# Patient Record
Sex: FEMALE | Race: ASIAN | Employment: OTHER | URBAN - METROPOLITAN AREA
[De-identification: names, ages, dates, MRNs, and addresses within clinical notes are randomized per-mention and may not be internally consistent; named-entity substitution may affect disease eponyms.]

---

## 2023-03-22 ENCOUNTER — HOSPITAL ENCOUNTER (OUTPATIENT)
Age: 78
Discharge: HOME | End: 2023-03-22
Payer: MEDICARE

## 2023-03-22 LAB
ALBUMIN SERPL-MCNC: 4.6 GM/DL (ref 3.4–5)
ALP BLD-CCNC: 53 IU/L (ref 40–128)
ALT SERPL-CCNC: 16 U/L (ref 10–40)
ANION GAP SERPL CALCULATED.3IONS-SCNC: 10 MMOL/L (ref 4–16)
AST SERPL-CCNC: 27 IU/L (ref 15–37)
BASOPHILS ABSOLUTE: 0 K/CU MM
BASOPHILS RELATIVE PERCENT: 0.4 % (ref 0–1)
BILIRUB SERPL-MCNC: 0.5 MG/DL (ref 0–1)
BUN SERPL-MCNC: 18 MG/DL (ref 6–23)
CALCIUM SERPL-MCNC: 9.7 MG/DL (ref 8.3–10.6)
CHLORIDE BLD-SCNC: 101 MMOL/L (ref 99–110)
CHOLEST SERPL-MCNC: 188 MG/DL
CO2: 30 MMOL/L (ref 21–32)
CREAT SERPL-MCNC: 0.8 MG/DL (ref 0.6–1.1)
CRP SERPL HS-MCNC: 3 MG/L
DIFFERENTIAL TYPE: ABNORMAL
EOSINOPHILS ABSOLUTE: 0.2 K/CU MM
EOSINOPHILS RELATIVE PERCENT: 4.6 % (ref 0–3)
GFR SERPL CREATININE-BSD FRML MDRD: >60 ML/MIN/1.73M2
GLUCOSE SERPL-MCNC: 100 MG/DL (ref 70–99)
HCT VFR BLD CALC: 43.1 % (ref 37–47)
HDLC SERPL-MCNC: 61 MG/DL
HEMOGLOBIN: 13.9 GM/DL (ref 12.5–16)
IMMATURE NEUTROPHIL %: 0.2 % (ref 0–0.43)
LDLC SERPL CALC-MCNC: 80 MG/DL
LYMPHOCYTES ABSOLUTE: 1.5 K/CU MM
LYMPHOCYTES RELATIVE PERCENT: 32.1 % (ref 24–44)
MCH RBC QN AUTO: 30.9 PG (ref 27–31)
MCHC RBC AUTO-ENTMCNC: 32.3 % (ref 32–36)
MCV RBC AUTO: 95.8 FL (ref 78–100)
MONOCYTES ABSOLUTE: 0.5 K/CU MM
MONOCYTES RELATIVE PERCENT: 10.5 % (ref 0–4)
NUCLEATED RBC %: 0 %
PDW BLD-RTO: 12.8 % (ref 11.7–14.9)
PLATELET # BLD: 224 K/CU MM (ref 140–440)
PMV BLD AUTO: 9.9 FL (ref 7.5–11.1)
POTASSIUM SERPL-SCNC: 4.8 MMOL/L (ref 3.5–5.1)
RBC # BLD: 4.5 M/CU MM (ref 4.2–5.4)
SEGMENTED NEUTROPHILS ABSOLUTE COUNT: 2.4 K/CU MM
SEGMENTED NEUTROPHILS RELATIVE PERCENT: 52.2 % (ref 36–66)
SODIUM BLD-SCNC: 141 MMOL/L (ref 135–145)
TOTAL IMMATURE NEUTOROPHIL: 0.01 K/CU MM
TOTAL NUCLEATED RBC: 0 K/CU MM
TOTAL PROTEIN: 6.9 GM/DL (ref 6.4–8.2)
TRIGL SERPL-MCNC: 237 MG/DL
TSH SERPL DL<=0.005 MIU/L-ACNC: 2.76 UIU/ML (ref 0.27–4.2)
WBC # BLD: 4.6 K/CU MM (ref 4–10.5)

## 2023-03-22 PROCEDURE — 85025 COMPLETE CBC W/AUTO DIFF WBC: CPT

## 2023-03-22 PROCEDURE — 80053 COMPREHEN METABOLIC PANEL: CPT

## 2023-03-22 PROCEDURE — 80061 LIPID PANEL: CPT

## 2023-03-22 PROCEDURE — 86140 C-REACTIVE PROTEIN: CPT

## 2023-03-22 PROCEDURE — 36415 COLL VENOUS BLD VENIPUNCTURE: CPT

## 2023-03-22 PROCEDURE — 84443 ASSAY THYROID STIM HORMONE: CPT

## 2024-04-03 ENCOUNTER — OFFICE VISIT (OUTPATIENT)
Dept: FAMILY MEDICINE CLINIC | Age: 79
End: 2024-04-03
Payer: MEDICARE

## 2024-04-03 VITALS
HEART RATE: 64 BPM | DIASTOLIC BLOOD PRESSURE: 84 MMHG | SYSTOLIC BLOOD PRESSURE: 122 MMHG | WEIGHT: 112 LBS | OXYGEN SATURATION: 99 % | HEIGHT: 60 IN | BODY MASS INDEX: 21.99 KG/M2

## 2024-04-03 DIAGNOSIS — J30.1 NON-SEASONAL ALLERGIC RHINITIS DUE TO POLLEN: ICD-10-CM

## 2024-04-03 DIAGNOSIS — I10 PRIMARY HYPERTENSION: Primary | ICD-10-CM

## 2024-04-03 DIAGNOSIS — K21.9 GASTROESOPHAGEAL REFLUX DISEASE WITHOUT ESOPHAGITIS: ICD-10-CM

## 2024-04-03 DIAGNOSIS — G89.29 CHRONIC PAIN OF RIGHT KNEE: ICD-10-CM

## 2024-04-03 DIAGNOSIS — M25.561 CHRONIC PAIN OF RIGHT KNEE: ICD-10-CM

## 2024-04-03 PROCEDURE — 99214 OFFICE O/P EST MOD 30 MIN: CPT | Performed by: INTERNAL MEDICINE

## 2024-04-03 PROCEDURE — 1123F ACP DISCUSS/DSCN MKR DOCD: CPT | Performed by: INTERNAL MEDICINE

## 2024-04-03 PROCEDURE — G8400 PT W/DXA NO RESULTS DOC: HCPCS | Performed by: INTERNAL MEDICINE

## 2024-04-03 PROCEDURE — G8420 CALC BMI NORM PARAMETERS: HCPCS | Performed by: INTERNAL MEDICINE

## 2024-04-03 PROCEDURE — 1036F TOBACCO NON-USER: CPT | Performed by: INTERNAL MEDICINE

## 2024-04-03 PROCEDURE — G8427 DOCREV CUR MEDS BY ELIG CLIN: HCPCS | Performed by: INTERNAL MEDICINE

## 2024-04-03 PROCEDURE — 3074F SYST BP LT 130 MM HG: CPT | Performed by: INTERNAL MEDICINE

## 2024-04-03 PROCEDURE — 3079F DIAST BP 80-89 MM HG: CPT | Performed by: INTERNAL MEDICINE

## 2024-04-03 PROCEDURE — 1090F PRES/ABSN URINE INCON ASSESS: CPT | Performed by: INTERNAL MEDICINE

## 2024-04-03 RX ORDER — FAMOTIDINE 40 MG/1
40 TABLET, FILM COATED ORAL NIGHTLY PRN
COMMUNITY
Start: 2022-03-21 | End: 2024-04-03 | Stop reason: SDUPTHER

## 2024-04-03 RX ORDER — FAMOTIDINE 40 MG/1
40 TABLET, FILM COATED ORAL NIGHTLY PRN
Qty: 90 TABLET | Refills: 3 | Status: SHIPPED | OUTPATIENT
Start: 2024-04-03 | End: 2025-03-29

## 2024-04-03 RX ORDER — OMEGA-3 FATTY ACIDS CAP DELAYED RELEASE 1000 MG 1000 MG
1000 CAPSULE DELAYED RELEASE ORAL DAILY
COMMUNITY

## 2024-04-03 RX ORDER — SIMVASTATIN 40 MG
40 TABLET ORAL NIGHTLY
Qty: 90 TABLET | Refills: 3 | Status: SHIPPED | OUTPATIENT
Start: 2024-04-03

## 2024-04-03 RX ORDER — MONTELUKAST SODIUM 10 MG/1
10 TABLET ORAL NIGHTLY
Qty: 90 TABLET | Refills: 3 | Status: SHIPPED | OUTPATIENT
Start: 2024-04-03 | End: 2025-03-29

## 2024-04-03 RX ORDER — MONTELUKAST SODIUM 10 MG/1
10 TABLET ORAL NIGHTLY
COMMUNITY
Start: 2022-03-21 | End: 2024-04-03 | Stop reason: SDUPTHER

## 2024-04-03 RX ORDER — UBIDECARENONE 100 MG
CAPSULE ORAL
COMMUNITY

## 2024-04-03 RX ORDER — ZINC GLUCONATE 50 MG
25 TABLET ORAL DAILY
COMMUNITY

## 2024-04-03 RX ORDER — LISINOPRIL 20 MG/1
20 TABLET ORAL DAILY
COMMUNITY
Start: 2022-03-21 | End: 2024-04-03 | Stop reason: SDUPTHER

## 2024-04-03 RX ORDER — LISINOPRIL 20 MG/1
20 TABLET ORAL DAILY
Qty: 90 TABLET | Refills: 3 | Status: SHIPPED | OUTPATIENT
Start: 2024-04-03 | End: 2025-03-29

## 2024-04-03 NOTE — PROGRESS NOTES
Outpatient Clinic Visit Note    Patient: Cesia Cleaning  : 1945 (79 y.o.)  Date: 4/3/2024    CC:  Chief Complaint   Patient presents with    Other     Patient thought she was here for an annual wellness.  Her last appt with Dr. Knox was 3---she was seeing a Dr with the [facility] since it was closer.  Patient states her knees bother her when she kneels down or bends them        HPI: she is feelng generally well and energy level is good.     Past Medical History:    History reviewed. No pertinent past medical history.    Past Surgical History:  History reviewed. No pertinent surgical history.    Home Medications:  Current Outpatient Medications   Medication Sig Dispense Refill    famotidine (PEPCID) 40 MG tablet Take 1 tablet by mouth nightly as needed      lisinopril (PRINIVIL;ZESTRIL) 20 MG tablet Take 1 tablet by mouth daily      montelukast (SINGULAIR) 10 MG tablet Take 1 tablet by mouth nightly      B Complex Vitamins (VITAMIN B COMPLEX PO) Take 1 tablet by mouth daily      CA PHOSPHATE-CHOLECALCIFEROL PO Take one tablet by mouth two times per day      coenzyme Q10 100 MG CAPS capsule Take one capsule daily by mouth      ascorbic acid (VITAMIN C) 100 MG tablet Take 5 tablets by mouth daily      zinc gluconate 50 MG tablet Take 0.5 tablets by mouth daily      Omega-3 Fatty Acids (FISH OIL) 1000 MG CPDR Take 1 capsule by mouth daily      MULTIPLE VITAMIN PO Take by mouth      Garlic 3 MG CAPS Take 25 mg by mouth       No current facility-administered medications for this visit.        Allergies:    Fosamax [alendronate]    Family History:   History reviewed. No pertinent family history.     ROS: A 10-organ Review Of Systems was obtained and otherwise unremarkable except as per HPI.    Data: Old records have been reviewed electronically.    PHYSICAL EXAM:  /84 (Site: Left Upper Arm, Position: Sitting, Cuff Size: Medium Adult)   Pulse 64   Ht 1.524 m (5')   Wt 50.8 kg (112 lb)

## 2024-04-08 ENCOUNTER — TELEPHONE (OUTPATIENT)
Dept: FAMILY MEDICINE CLINIC | Age: 79
End: 2024-04-08

## 2024-04-08 NOTE — TELEPHONE ENCOUNTER
To Dr. Knox-    Patient forgot to mention her hemorrhoids that she has----she said you prescribed something for her about 2 years ago and wanted to know if you will send that in again for her.    Last office visit:  4/3/24      [place] in [place]

## 2024-04-10 RX ORDER — HYDROCORTISONE 25 MG/G
CREAM TOPICAL
Qty: 28 G | Refills: 2 | Status: SHIPPED | OUTPATIENT
Start: 2024-04-10

## 2024-05-09 ENCOUNTER — TELEMEDICINE (OUTPATIENT)
Dept: FAMILY MEDICINE CLINIC | Age: 79
End: 2024-05-09
Payer: MEDICARE

## 2024-05-09 DIAGNOSIS — Z00.00 INITIAL MEDICARE ANNUAL WELLNESS VISIT: Primary | ICD-10-CM

## 2024-05-09 PROCEDURE — G0438 PPPS, INITIAL VISIT: HCPCS | Performed by: INTERNAL MEDICINE

## 2024-05-09 PROCEDURE — 1123F ACP DISCUSS/DSCN MKR DOCD: CPT | Performed by: INTERNAL MEDICINE

## 2024-05-09 SDOH — ECONOMIC STABILITY: INCOME INSECURITY: HOW HARD IS IT FOR YOU TO PAY FOR THE VERY BASICS LIKE FOOD, HOUSING, MEDICAL CARE, AND HEATING?: NOT VERY HARD

## 2024-05-09 SDOH — ECONOMIC STABILITY: FOOD INSECURITY: WITHIN THE PAST 12 MONTHS, THE FOOD YOU BOUGHT JUST DIDN'T LAST AND YOU DIDN'T HAVE MONEY TO GET MORE.: NEVER TRUE

## 2024-05-09 SDOH — ECONOMIC STABILITY: FOOD INSECURITY: WITHIN THE PAST 12 MONTHS, YOU WORRIED THAT YOUR FOOD WOULD RUN OUT BEFORE YOU GOT MONEY TO BUY MORE.: NEVER TRUE

## 2024-05-09 SDOH — ECONOMIC STABILITY: HOUSING INSECURITY
IN THE LAST 12 MONTHS, WAS THERE A TIME WHEN YOU DID NOT HAVE A STEADY PLACE TO SLEEP OR SLEPT IN A SHELTER (INCLUDING NOW)?: NO

## 2024-05-09 ASSESSMENT — PATIENT HEALTH QUESTIONNAIRE - PHQ9
SUM OF ALL RESPONSES TO PHQ QUESTIONS 1-9: 0
SUM OF ALL RESPONSES TO PHQ9 QUESTIONS 1 & 2: 0
SUM OF ALL RESPONSES TO PHQ QUESTIONS 1-9: 0
2. FEELING DOWN, DEPRESSED OR HOPELESS: NOT AT ALL
SUM OF ALL RESPONSES TO PHQ QUESTIONS 1-9: 0
1. LITTLE INTEREST OR PLEASURE IN DOING THINGS: NOT AT ALL
SUM OF ALL RESPONSES TO PHQ QUESTIONS 1-9: 0

## 2024-05-09 ASSESSMENT — LIFESTYLE VARIABLES
HOW OFTEN DO YOU HAVE A DRINK CONTAINING ALCOHOL: NEVER
HOW MANY STANDARD DRINKS CONTAINING ALCOHOL DO YOU HAVE ON A TYPICAL DAY: PATIENT DOES NOT DRINK

## 2024-05-09 NOTE — PROGRESS NOTES
Medicare Annual Wellness Visit    Cesia Cleaning is here for Medicare AWV    Assessment & Plan   Initial Medicare annual wellness visit  Recommendations for Preventive Services Due: see orders and patient instructions/AVS.  Recommended screening schedule for the next 5-10 years is provided to the patient in written form: see Patient Instructions/AVS.     No follow-ups on file.     Subjective       Patient's complete Health Risk Assessment and screening values have been reviewed and are found in Flowsheets. The following problems were reviewed today and where indicated follow up appointments were made and/or referrals ordered.    Positive Risk Factor Screenings with Interventions:       Cognitive:      Words recalled: 2 Words Recalled     Total Score Interpretation: Abnormal Mini-Cog  Interventions:  Patient declines any further evaluation or treatment                  Advanced Directives:  Do you have a Living Will?: (!) No    Intervention:                       Objective      Patient-Reported Vitals  No data recorded            Allergies   Allergen Reactions    Fosamax [Alendronate]      Prior to Visit Medications    Medication Sig Taking? Authorizing Provider   hydrocortisone (ANUSOL-HC) 2.5 % CREA rectal cream Apply three times daily as needed to swelling and irritation. Yes Gabriel Knox MD   B Complex Vitamins (VITAMIN B COMPLEX PO) Take 1 tablet by mouth daily Yes Leona Plascencia MD   CA PHOSPHATE-CHOLECALCIFEROL PO Take one tablet by mouth two times per day Yes Leona Plascencia MD   coenzyme Q10 100 MG CAPS capsule Take one capsule daily by mouth Yes Leona Plascencia MD   ascorbic acid (VITAMIN C) 100 MG tablet Take 5 tablets by mouth daily Yes Leona Plascencia MD   zinc gluconate 50 MG tablet Take 0.5 tablets by mouth daily Yes Leona Plascencia MD   Omega-3 Fatty Acids (FISH OIL) 1000 MG CPDR Take 1 capsule by mouth daily Yes Leona Plascencia MD   MULTIPLE VITAMIN PO Take

## 2024-05-09 NOTE — PATIENT INSTRUCTIONS
Personalized Preventive Plan for May Cleaning - 5/9/2024  Medicare offers a range of preventive health benefits. Some of the tests and screenings are paid in full while other may be subject to a deductible, co-insurance, and/or copay.    Some of these benefits include a comprehensive review of your medical history including lifestyle, illnesses that may run in your family, and various assessments and screenings as appropriate.    After reviewing your medical record and screening and assessments performed today your provider may have ordered immunizations, labs, imaging, and/or referrals for you.  A list of these orders (if applicable) as well as your Preventive Care list are included within your After Visit Summary for your review.    Other Preventive Recommendations:    A preventive eye exam performed by an eye specialist is recommended every 1-2 years to screen for glaucoma; cataracts, macular degeneration, and other eye disorders.  A preventive dental visit is recommended every 6 months.  Try to get at least 150 minutes of exercise per week or 10,000 steps per day on a pedometer .  Order or download the FREE \"Exercise & Physical Activity: Your Everyday Guide\" from [employer]. Call 1-842.871.3200 or search [employer] online.  You need 4769-7923 mg of calcium and 5687-6868 IU of vitamin D per day. It is possible to meet your calcium requirement with diet alone, but a vitamin D supplement is usually necessary to meet this goal.  When exposed to the sun, use a sunscreen that protects against both UVA and UVB radiation with an SPF of 30 or greater. Reapply every 2 to 3 hours or after sweating, drying off with a towel, or swimming.  Always wear a seat belt when traveling in a car. Always wear a helmet when riding a bicycle or motorcycle.

## 2024-05-20 ENCOUNTER — OFFICE VISIT (OUTPATIENT)
Dept: ORTHOPEDIC SURGERY | Age: 79
End: 2024-05-20
Payer: MEDICARE

## 2024-05-20 VITALS — DIASTOLIC BLOOD PRESSURE: 78 MMHG | OXYGEN SATURATION: 98 % | HEART RATE: 58 BPM | SYSTOLIC BLOOD PRESSURE: 122 MMHG

## 2024-05-20 DIAGNOSIS — M11.261 CHONDROCALCINOSIS OF RIGHT KNEE: Primary | ICD-10-CM

## 2024-05-20 PROCEDURE — 1036F TOBACCO NON-USER: CPT | Performed by: STUDENT IN AN ORGANIZED HEALTH CARE EDUCATION/TRAINING PROGRAM

## 2024-05-20 PROCEDURE — G8400 PT W/DXA NO RESULTS DOC: HCPCS | Performed by: STUDENT IN AN ORGANIZED HEALTH CARE EDUCATION/TRAINING PROGRAM

## 2024-05-20 PROCEDURE — G8427 DOCREV CUR MEDS BY ELIG CLIN: HCPCS | Performed by: STUDENT IN AN ORGANIZED HEALTH CARE EDUCATION/TRAINING PROGRAM

## 2024-05-20 PROCEDURE — G8420 CALC BMI NORM PARAMETERS: HCPCS | Performed by: STUDENT IN AN ORGANIZED HEALTH CARE EDUCATION/TRAINING PROGRAM

## 2024-05-20 PROCEDURE — 1123F ACP DISCUSS/DSCN MKR DOCD: CPT | Performed by: STUDENT IN AN ORGANIZED HEALTH CARE EDUCATION/TRAINING PROGRAM

## 2024-05-20 PROCEDURE — 3074F SYST BP LT 130 MM HG: CPT | Performed by: STUDENT IN AN ORGANIZED HEALTH CARE EDUCATION/TRAINING PROGRAM

## 2024-05-20 PROCEDURE — 3078F DIAST BP <80 MM HG: CPT | Performed by: STUDENT IN AN ORGANIZED HEALTH CARE EDUCATION/TRAINING PROGRAM

## 2024-05-20 PROCEDURE — 99203 OFFICE O/P NEW LOW 30 MIN: CPT | Performed by: STUDENT IN AN ORGANIZED HEALTH CARE EDUCATION/TRAINING PROGRAM

## 2024-05-20 PROCEDURE — 1090F PRES/ABSN URINE INCON ASSESS: CPT | Performed by: STUDENT IN AN ORGANIZED HEALTH CARE EDUCATION/TRAINING PROGRAM

## 2024-05-20 ASSESSMENT — ENCOUNTER SYMPTOMS
BACK PAIN: 0
SORE THROAT: 0
WHEEZING: 0
COUGH: 0
VOMITING: 0
SHORTNESS OF BREATH: 0
NAUSEA: 0
COLOR CHANGE: 0
VOICE CHANGE: 0

## 2024-05-20 NOTE — PROGRESS NOTES
5/20/2024   Chief Complaint   Patient presents with    Knee Pain     Right        History of Present Illness:                             Cesia Cleaning is a 79 y.o. female referred by self for evaluation and treatment of bilateral knee pain.  Patient has no known injury but states she has had pain for several years.  It does not limit her activities with the exception of kneeling when she goes to pray.  Otherwise, she extensively walks and is active and has only minimal pain.  No prior injury.  No advanced imaging thus far.  No treatment thus far.  This my first time seeing the patient    The pain's location is medial and lateral joint line bilaterally, worse on the. she describes the symptoms as aching, sharp and stabbing. Symptoms improve with rest. Symptoms worsen with deep knee bending especially kneeling, getting up from a chair, weight bearing, sitting for prolonged periods of time, twisting activities.     Patient denies prior injury to knee, denies numbness, tingling, fever, chills.    Denies swelling or effusions.  Intermittent mild mechanical symptoms. Denies instability.  Worse with increased active. Better with rest,     Treatment thus far has included rest, activity modifications, oral medications with minimal relief.  Here today to discuss diagnosis and treatment options.      Is affecting ADLs.  Pain is 8/10 at it's worst.    No advanced imaging thus far    Outside reports reviewed: none.    Patient's medications, allergies, past medical, surgical, social and family histories were reviewed and updated as appropriate.          Medical History  Patient's medications, allergies, past medical, surgical, social and family histories were reviewed and updated as appropriate.    No past medical history on file.  No past surgical history on file.  No family history on file.  Social History     Socioeconomic History    Marital status:    Tobacco Use    Smoking status: Never    Smokeless tobacco: Never

## 2024-05-20 NOTE — PROGRESS NOTES
New patient is here for bilateral knee pain. Pain is greater in the right knee, and has limited ROM in right knee. No ROM limitation in left knee. No history of injections, procedure or injury to either knee.